# Patient Record
Sex: MALE
[De-identification: names, ages, dates, MRNs, and addresses within clinical notes are randomized per-mention and may not be internally consistent; named-entity substitution may affect disease eponyms.]

---

## 2024-10-08 ENCOUNTER — DASHBOARD ENCOUNTERS (OUTPATIENT)
Age: 49
End: 2024-10-08

## 2024-10-08 ENCOUNTER — OFFICE VISIT (OUTPATIENT)
Dept: URBAN - METROPOLITAN AREA CLINIC 35 | Facility: CLINIC | Age: 49
End: 2024-10-08
Payer: COMMERCIAL

## 2024-10-08 VITALS
WEIGHT: 169 LBS | HEIGHT: 72 IN | DIASTOLIC BLOOD PRESSURE: 78 MMHG | BODY MASS INDEX: 22.89 KG/M2 | SYSTOLIC BLOOD PRESSURE: 116 MMHG

## 2024-10-08 DIAGNOSIS — Z12.12 ENCOUNTER FOR SCREENING FOR MALIGNANT NEOPLASM OF RECTUM: ICD-10-CM

## 2024-10-08 DIAGNOSIS — Z12.11 ENCOUNTER FOR SCREENING FOR MALIGNANT NEOPLASM OF COLON: ICD-10-CM

## 2024-10-08 PROCEDURE — 99202 OFFICE O/P NEW SF 15 MIN: CPT | Performed by: PHYSICIAN ASSISTANT

## 2024-10-08 NOTE — HPI-COLORECTAL CANCER SCREENING
50 y/o male patient presents today for a colorectal cancer screening. Patient admits this will be his first colonoscopy. He denies a family history of colon, gastric, or esophageal cancer/polyps. Currently reports 1-2 bowel movements per day without strain. His stools are formed without blood, mucus, or melena. He denies any episodes of rectal pain or pruritus ani.

## 2024-10-21 ENCOUNTER — CLAIMS CREATED FROM THE CLAIM WINDOW (OUTPATIENT)
Dept: URBAN - METROPOLITAN AREA CLINIC 4 | Facility: CLINIC | Age: 49
End: 2024-10-21
Payer: COMMERCIAL

## 2024-10-21 ENCOUNTER — OFFICE VISIT (OUTPATIENT)
Dept: URBAN - METROPOLITAN AREA SURGERY CENTER 8 | Facility: SURGERY CENTER | Age: 49
End: 2024-10-21

## 2024-10-21 ENCOUNTER — CLAIMS CREATED FROM THE CLAIM WINDOW (OUTPATIENT)
Dept: URBAN - METROPOLITAN AREA SURGERY CENTER 8 | Facility: SURGERY CENTER | Age: 49
End: 2024-10-21
Payer: COMMERCIAL

## 2024-10-21 DIAGNOSIS — K63.5 BENIGN COLON POLYPS: ICD-10-CM

## 2024-10-21 DIAGNOSIS — D12.2 BENIGN NEOPLASM OF ASCENDING COLON: ICD-10-CM

## 2024-10-21 DIAGNOSIS — D12.0 BENIGN NEOPLASM OF CECUM: ICD-10-CM

## 2024-10-21 DIAGNOSIS — D12.3 BENIGN NEOPLASM OF TRANSVERSE COLON: ICD-10-CM

## 2024-10-21 DIAGNOSIS — K64.8 OTHER HEMORRHOIDS: ICD-10-CM

## 2024-10-21 DIAGNOSIS — Z12.11 COLON CANCER SCREENING: ICD-10-CM

## 2024-10-21 PROCEDURE — 88305 TISSUE EXAM BY PATHOLOGIST: CPT | Performed by: PATHOLOGY

## 2024-10-21 PROCEDURE — 00812 ANES LWR INTST SCR COLSC: CPT | Performed by: NURSE ANESTHETIST, CERTIFIED REGISTERED

## 2024-11-04 ENCOUNTER — OFFICE VISIT (OUTPATIENT)
Dept: URBAN - METROPOLITAN AREA CLINIC 35 | Facility: CLINIC | Age: 49
End: 2024-11-04
Payer: COMMERCIAL

## 2024-11-04 VITALS
HEIGHT: 72 IN | WEIGHT: 169 LBS | SYSTOLIC BLOOD PRESSURE: 114 MMHG | DIASTOLIC BLOOD PRESSURE: 76 MMHG | BODY MASS INDEX: 22.89 KG/M2

## 2024-11-04 DIAGNOSIS — D36.9 ADENOMATOUS POLYPS: ICD-10-CM

## 2024-11-04 PROCEDURE — 99212 OFFICE O/P EST SF 10 MIN: CPT | Performed by: PHYSICIAN ASSISTANT

## 2024-11-04 NOTE — HPI-COLONOSCOPY FOLLOWUP
49 year old male patient presents today for a follow up from his colonoscopy. He currently reports 1-2 bowel movements per day without strain. His stools are formed without blood, mucus, or melena. He denies any episodes of rectal pain or pruritus ani. He denies any complications post procedure.   Colonoscopy report shows: Preparation of the colon was fair. The examined portion of the ileum was normal. Two small (4-6 mm) polyps in the cecum and in the proximal ascending colon. Two 2 to 3 mm polyps in the distal ascending colon. One small (4-6 mm) polyp in the proximal transverse colon. One 12 to 15 mm polyp in the descending colon One 10 mm polyp in the mid sigmoid colon. One 4 mm polyp in the rectum. Internal hemorrhoids. The examination was otherwise normal on direct and retroflexion views. Cecum, Polypectomy (Cold Snare): SESSILE SERRATED ADENOMA/POLYP(S). Colon, Ascending;Proximal, Polypectomy (Cold Snare): TUBULAR ADENOMA(S). Colon, Ascending;Distal, Polypectomy (Cold Forceps): TUBULAR ADENOMA(S). Colon, Transverse;Proximal, Polypectomy (Cold Snare): TUBULAR ADENOMA(S). Colon, Descending, Polypectomy (Hot Snare): TUBULAR ADENOMA(S). Colon, Sigmoid;mid, Polypectomy (Hot Snare): TUBULAR ADENOMA(S). Rectum, Polypectomy (Cold Forceps): TUBULAR ADENOMA(S).